# Patient Record
Sex: MALE | Race: WHITE
[De-identification: names, ages, dates, MRNs, and addresses within clinical notes are randomized per-mention and may not be internally consistent; named-entity substitution may affect disease eponyms.]

---

## 2020-03-06 ENCOUNTER — HOSPITAL ENCOUNTER (EMERGENCY)
Dept: HOSPITAL 76 - ED | Age: 20
Discharge: HOME | End: 2020-03-06
Payer: COMMERCIAL

## 2020-03-06 VITALS — SYSTOLIC BLOOD PRESSURE: 130 MMHG | DIASTOLIC BLOOD PRESSURE: 80 MMHG

## 2020-03-06 DIAGNOSIS — K12.0: ICD-10-CM

## 2020-03-06 DIAGNOSIS — B97.89: ICD-10-CM

## 2020-03-06 DIAGNOSIS — K14.0: Primary | ICD-10-CM

## 2020-03-06 DIAGNOSIS — J02.8: ICD-10-CM

## 2020-03-06 PROCEDURE — 99282 EMERGENCY DEPT VISIT SF MDM: CPT

## 2020-03-06 NOTE — ED PHYSICIAN DOCUMENTATION
History of Present Illness





- Stated complaint


Stated Complaint: MOUTH/THROAT SWELLING





- Chief complaint


Chief Complaint: Heent





- History obtained from


History obtained from: Patient (Previously healthy 20-year-old gentleman, active

duty in the [employer].  He has had a sore on the left side of his tongue for last few

days associate with a scratchy throat and a headache today.  No fevers.  No 

recent travel.  No cough.)





Review of Systems


Constitutional: denies: Fever, Chills


Nose: denies: Rhinorrhea / runny nose, Congestion


Throat: denies: Sore throat


Cardiac: denies: Chest pain / pressure, Palpitations


Respiratory: denies: Dyspnea, Cough





PD PAST MEDICAL HISTORY





- Past Medical History


Past Medical History: No





- Past Surgical History


Past Surgical History: No





- Present Medications


Home Medications: 


                                Ambulatory Orders











 Medication  Instructions  Recorded  Confirmed


 


No Known Home Medications  03/06/20 03/06/20














- Allergies


Allergies/Adverse Reactions: 


                                    Allergies











Allergy/AdvReac Type Severity Reaction Status Date / Time


 


No Known Drug Allergies Allergy   Verified 03/06/20 14:46














- Social History


Does the pt smoke?: No


Smoking Status: Never smoker


Does the pt drink ETOH?: No





- Immunizations


Immunizations are current?: Yes





PD ED PE NORMAL





- Vitals


Vital signs reviewed: Yes





- General


General: Alert and oriented X 3, No acute distress





- HEENT


HEENT: Other (There is a single aphthous ulcer on the left side of the tongue, 

the tonsillar pillars are mildly red but the tonsils themselves are not swollen 

or have exudates.  Neck is supple.  No adenopathy.)





- Neck


Neck: Supple, no meningeal sign, No bony TTP





- Derm


Derm: Normal color, Warm and dry





- Extremities


Extremities: No edema, No calf tenderness / cord





- Neuro


Neuro: Alert and oriented X 3, Normal speech





Results





- Vitals


Vitals: 





                               Vital Signs - 24 hr











  03/06/20





  14:43


 


Temperature 36.4 C L


 


Heart Rate 67


 


Respiratory 16





Rate 


 


Blood Pressure 130/80


 


O2 Saturation 96








                                     Oxygen











O2 Source                      Room air

















PD MEDICAL DECISION MAKING





- ED course


ED course: 





His processing is consistent with a viral infection causing an aphthous ulcer 

and pharyngitis.  0 out of 4 Cent[facility] criteria.





Departure





- Departure


Disposition: 01 Home, Self Care


Clinical Impression: 


 Tongue ulcer





Condition: Good


Record reviewed to determine appropriate education?: Yes


Instructions:  ED Stomatitis Ch


Comments: 


This is consistent with a viral process, no specific treatment is necessary but 

return if not better In 3 days or anytime if worse.


Forms:  Activity restrictions